# Patient Record
Sex: MALE | Race: WHITE | NOT HISPANIC OR LATINO | Employment: FULL TIME | URBAN - METROPOLITAN AREA
[De-identification: names, ages, dates, MRNs, and addresses within clinical notes are randomized per-mention and may not be internally consistent; named-entity substitution may affect disease eponyms.]

---

## 2023-03-14 LAB
ALANINE AMINOTRANSFERASE (SGPT) (U/L) IN SER/PLAS: 116 U/L (ref 10–52)
ALBUMIN (G/DL) IN SER/PLAS: 4.4 G/DL (ref 3.4–5)
ALKALINE PHOSPHATASE (U/L) IN SER/PLAS: 78 U/L (ref 33–120)
ANION GAP IN SER/PLAS: 12 MMOL/L (ref 10–20)
ASPARTATE AMINOTRANSFERASE (SGOT) (U/L) IN SER/PLAS: 60 U/L (ref 9–39)
BASOPHILS (10*3/UL) IN BLOOD BY AUTOMATED COUNT: 0.08 X10E9/L (ref 0–0.1)
BASOPHILS/100 LEUKOCYTES IN BLOOD BY AUTOMATED COUNT: 1.6 % (ref 0–2)
BILIRUBIN DIRECT (MG/DL) IN SER/PLAS: 0.2 MG/DL (ref 0–0.3)
BILIRUBIN TOTAL (MG/DL) IN SER/PLAS: 1.3 MG/DL (ref 0–1.2)
CALCIUM (MG/DL) IN SER/PLAS: 9.5 MG/DL (ref 8.6–10.6)
CARBON DIOXIDE, TOTAL (MMOL/L) IN SER/PLAS: 28 MMOL/L (ref 21–32)
CERULOPLASMIN (MG/DL) IN SER/PLAS: 25 MG/DL (ref 20–60)
CHLORIDE (MMOL/L) IN SER/PLAS: 103 MMOL/L (ref 98–107)
CREATININE (MG/DL) IN SER/PLAS: 1.04 MG/DL (ref 0.5–1.3)
EOSINOPHILS (10*3/UL) IN BLOOD BY AUTOMATED COUNT: 0.25 X10E9/L (ref 0–0.7)
EOSINOPHILS/100 LEUKOCYTES IN BLOOD BY AUTOMATED COUNT: 5 % (ref 0–6)
ERYTHROCYTE DISTRIBUTION WIDTH (RATIO) BY AUTOMATED COUNT: 12.7 % (ref 11.5–14.5)
ERYTHROCYTE MEAN CORPUSCULAR HEMOGLOBIN CONCENTRATION (G/DL) BY AUTOMATED: 33.5 G/DL (ref 32–36)
ERYTHROCYTE MEAN CORPUSCULAR VOLUME (FL) BY AUTOMATED COUNT: 94 FL (ref 80–100)
ERYTHROCYTES (10*6/UL) IN BLOOD BY AUTOMATED COUNT: 5.16 X10E12/L (ref 4.5–5.9)
FERRITIN (UG/LL) IN SER/PLAS: 292 UG/L (ref 20–300)
GFR MALE: 85 ML/MIN/1.73M2
GLUCOSE (MG/DL) IN SER/PLAS: 81 MG/DL (ref 74–99)
HEMATOCRIT (%) IN BLOOD BY AUTOMATED COUNT: 48.4 % (ref 41–52)
HEMOGLOBIN (G/DL) IN BLOOD: 16.2 G/DL (ref 13.5–17.5)
HEPATITIS A TOTAL AB INTERPRETATION: NONREACTIVE
HEPATITIS B VIRUS SURFACE AB (MIU/ML) IN SERUM: <3.1 MIU/ML
IMMATURE GRANULOCYTES/100 LEUKOCYTES IN BLOOD BY AUTOMATED COUNT: 0.2 % (ref 0–0.9)
INR IN PPP BY COAGULATION ASSAY: 1 (ref 0.9–1.1)
IRON (UG/DL) IN SER/PLAS: 110 UG/DL (ref 35–150)
IRON BINDING CAPACITY (UG/DL) IN SER/PLAS: 350 UG/DL (ref 240–445)
IRON SATURATION (%) IN SER/PLAS: 31 % (ref 25–45)
LEUKOCYTES (10*3/UL) IN BLOOD BY AUTOMATED COUNT: 5 X10E9/L (ref 4.4–11.3)
LYMPHOCYTES (10*3/UL) IN BLOOD BY AUTOMATED COUNT: 1.7 X10E9/L (ref 1.2–4.8)
LYMPHOCYTES/100 LEUKOCYTES IN BLOOD BY AUTOMATED COUNT: 34.1 % (ref 13–44)
MONOCYTES (10*3/UL) IN BLOOD BY AUTOMATED COUNT: 0.53 X10E9/L (ref 0.1–1)
MONOCYTES/100 LEUKOCYTES IN BLOOD BY AUTOMATED COUNT: 10.6 % (ref 2–10)
NEUTROPHILS (10*3/UL) IN BLOOD BY AUTOMATED COUNT: 2.42 X10E9/L (ref 1.2–7.7)
NEUTROPHILS/100 LEUKOCYTES IN BLOOD BY AUTOMATED COUNT: 48.5 % (ref 40–80)
NRBC (PER 100 WBCS) BY AUTOMATED COUNT: 0 /100 WBC (ref 0–0)
PLATELETS (10*3/UL) IN BLOOD AUTOMATED COUNT: 187 X10E9/L (ref 150–450)
POTASSIUM (MMOL/L) IN SER/PLAS: 4 MMOL/L (ref 3.5–5.3)
PROTEIN TOTAL: 7.5 G/DL (ref 6.4–8.2)
PROTHROMBIN TIME (PT) IN PPP BY COAGULATION ASSAY: 11.2 SEC (ref 9.8–13.4)
SODIUM (MMOL/L) IN SER/PLAS: 139 MMOL/L (ref 136–145)
UREA NITROGEN (MG/DL) IN SER/PLAS: 11 MG/DL (ref 6–23)

## 2023-03-15 LAB
ANA PATTERN: ABNORMAL
ANA TITER: ABNORMAL
ANTI-CENTROMERE: <0.2 AI
ANTI-CHROMATIN: <0.2 AI
ANTI-DNA (DS): <1 IU/ML
ANTI-JO-1 IGG: <0.2 AI
ANTI-NUCLEAR ANTIBODY (ANA): POSITIVE
ANTI-RIBOSOMAL P: <0.2 AI
ANTI-RNP: <0.2 AI
ANTI-SCL-70: <0.2 AI
ANTI-SM/RNP: <0.2 AI
ANTI-SM: <0.2 AI
ANTI-SMOOTH MUSCLE ANTIBODY: NEGATIVE
ANTI-SSA: <0.2 AI
ANTI-SSB: <0.2 AI
HCV PCR QUANT: NOT DETECTED IU/ML
HCV RNA, PCR LOG: NORMAL LOG10 IU/ML
MITOCHONDRIAL ANTIBODY: NEGATIVE

## 2023-03-20 LAB
ALPHA-1 ANTITRYPSIN PHENOTYPE: NORMAL
ALPHA-1-ANTITRYPSIN (REF): 160 MG/DL (ref 90–200)

## 2023-04-11 LAB
ALANINE AMINOTRANSFERASE (SGPT) (U/L) IN SER/PLAS: 129 U/L (ref 10–52)
ALANINE AMINOTRANSFERASE (SGPT) (U/L) IN SER/PLAS: 130 U/L (ref 10–52)
ALBUMIN (G/DL) IN SER/PLAS: 3.8 G/DL (ref 3.4–5)
ALBUMIN (G/DL) IN SER/PLAS: 3.9 G/DL (ref 3.4–5)
ALKALINE PHOSPHATASE (U/L) IN SER/PLAS: 71 U/L (ref 33–120)
ALKALINE PHOSPHATASE (U/L) IN SER/PLAS: 71 U/L (ref 33–120)
ANION GAP IN SER/PLAS: 11 MMOL/L (ref 10–20)
ASPARTATE AMINOTRANSFERASE (SGOT) (U/L) IN SER/PLAS: 68 U/L (ref 9–39)
ASPARTATE AMINOTRANSFERASE (SGOT) (U/L) IN SER/PLAS: 69 U/L (ref 9–39)
BILIRUBIN DIRECT (MG/DL) IN SER/PLAS: 0.2 MG/DL (ref 0–0.3)
BILIRUBIN TOTAL (MG/DL) IN SER/PLAS: 1.4 MG/DL (ref 0–1.2)
BILIRUBIN TOTAL (MG/DL) IN SER/PLAS: 1.4 MG/DL (ref 0–1.2)
CALCIUM (MG/DL) IN SER/PLAS: 8.8 MG/DL (ref 8.6–10.6)
CARBON DIOXIDE, TOTAL (MMOL/L) IN SER/PLAS: 29 MMOL/L (ref 21–32)
CHLORIDE (MMOL/L) IN SER/PLAS: 105 MMOL/L (ref 98–107)
CHOLESTEROL (MG/DL) IN SER/PLAS: 229 MG/DL (ref 0–199)
CHOLESTEROL IN HDL (MG/DL) IN SER/PLAS: 59.4 MG/DL
CHOLESTEROL/HDL RATIO: 3.9
CREATININE (MG/DL) IN SER/PLAS: 0.83 MG/DL (ref 0.5–1.3)
GFR MALE: >90 ML/MIN/1.73M2
GLUCOSE (MG/DL) IN SER/PLAS: 86 MG/DL (ref 74–99)
LDL: 153 MG/DL (ref 0–99)
POTASSIUM (MMOL/L) IN SER/PLAS: 4.5 MMOL/L (ref 3.5–5.3)
PROTEIN TOTAL: 7.1 G/DL (ref 6.4–8.2)
PROTEIN TOTAL: 7.2 G/DL (ref 6.4–8.2)
SODIUM (MMOL/L) IN SER/PLAS: 140 MMOL/L (ref 136–145)
TRIGLYCERIDE (MG/DL) IN SER/PLAS: 81 MG/DL (ref 0–149)
UREA NITROGEN (MG/DL) IN SER/PLAS: 10 MG/DL (ref 6–23)
VLDL: 16 MG/DL (ref 0–40)

## 2023-07-15 LAB
11-NOR-9-CARBOXY-THC, URN, QUANT: 343 NG/ML
AMPHETAMINES,URINE: 536 NG/ML
MDA,URINE: <200 NG/ML
MDEA,URINE: <200 NG/ML
MDMA,UR: <200 NG/ML
METHAMPHETAMINE QUANTITATIVE URINE: <200 NG/ML
PHENTERMINE,UR: <200 NG/ML

## 2023-07-18 LAB
6-ACETYLMORPHINE: <25 NG/ML
7-AMINOCLONAZEPAM: <25 NG/ML
ALPHA-HYDROXYALPRAZOLAM: <25 NG/ML
ALPHA-HYDROXYMIDAZOLAM: <25 NG/ML
ALPRAZOLAM: <25 NG/ML
AMPHETAMINE (PRESENCE) IN URINE BY SCREEN METHOD: ABNORMAL
BARBITURATES PRESENCE IN URINE BY SCREEN METHOD: ABNORMAL
CANNABINOIDS IN URINE BY SCREEN METHOD: ABNORMAL
CHLORDIAZEPOXIDE: <25 NG/ML
CLONAZEPAM: <25 NG/ML
COCAINE (PRESENCE) IN URINE BY SCREEN METHOD: ABNORMAL
CODEINE: <50 NG/ML
CREATINE, URINE FOR DRUG: 158.3 MG/DL
DIAZEPAM: <25 NG/ML
DRUG SCREEN COMMENT URINE: ABNORMAL
EDDP: <25 NG/ML
FENTANYL CONFIRMATION, URINE: <2.5 NG/ML
HYDROCODONE: <25 NG/ML
HYDROMORPHONE: <25 NG/ML
LORAZEPAM: <25 NG/ML
METHADONE CONFIRMATION,URINE: <25 NG/ML
MIDAZOLAM: <25 NG/ML
MORPHINE URINE: <50 NG/ML
NORDIAZEPAM: <25 NG/ML
NORFENTANYL: <2.5 NG/ML
NORHYDROCODONE: <25 NG/ML
NOROXYCODONE: <25 NG/ML
O-DESMETHYLTRAMADOL: <50 NG/ML
OXAZEPAM: <25 NG/ML
OXYCODONE: <25 NG/ML
OXYMORPHONE: <25 NG/ML
PHENCYCLIDINE (PRESENCE) IN URINE BY SCREEN METHOD: ABNORMAL
TEMAZEPAM: <25 NG/ML
TRAMADOL: <50 NG/ML
ZOLPIDEM METABOLITE (ZCA): <25 NG/ML
ZOLPIDEM: <25 NG/ML

## 2023-08-28 LAB
ALANINE AMINOTRANSFERASE (SGPT) (U/L) IN SER/PLAS: 70 U/L (ref 10–52)
ALBUMIN (G/DL) IN SER/PLAS: 4.3 G/DL (ref 3.4–5)
ALKALINE PHOSPHATASE (U/L) IN SER/PLAS: 68 U/L (ref 33–120)
ASPARTATE AMINOTRANSFERASE (SGOT) (U/L) IN SER/PLAS: 48 U/L (ref 9–39)
BILIRUBIN DIRECT (MG/DL) IN SER/PLAS: 0.1 MG/DL (ref 0–0.3)
BILIRUBIN TOTAL (MG/DL) IN SER/PLAS: 0.8 MG/DL (ref 0–1.2)
PROTEIN TOTAL: 7.6 G/DL (ref 6.4–8.2)

## 2023-08-30 LAB
6-ACETYLMORPHINE: <25 NG/ML
7-AMINOCLONAZEPAM: <25 NG/ML
ALPHA-HYDROXYALPRAZOLAM: <25 NG/ML
ALPHA-HYDROXYMIDAZOLAM: <25 NG/ML
ALPRAZOLAM: <25 NG/ML
AMPHETAMINE (PRESENCE) IN URINE BY SCREEN METHOD: NORMAL
BARBITURATES PRESENCE IN URINE BY SCREEN METHOD: NORMAL
CANNABINOIDS IN URINE BY SCREEN METHOD: NORMAL
CHLORDIAZEPOXIDE: <25 NG/ML
CLONAZEPAM: <25 NG/ML
COCAINE (PRESENCE) IN URINE BY SCREEN METHOD: NORMAL
CODEINE: <50 NG/ML
CREATINE, URINE FOR DRUG: 86.6 MG/DL
DIAZEPAM: <25 NG/ML
DRUG SCREEN COMMENT URINE: NORMAL
EDDP: <25 NG/ML
FENTANYL CONFIRMATION, URINE: <2.5 NG/ML
HYDROCODONE: <25 NG/ML
HYDROMORPHONE: <25 NG/ML
LORAZEPAM: <25 NG/ML
METHADONE CONFIRMATION,URINE: <25 NG/ML
MIDAZOLAM: <25 NG/ML
MORPHINE URINE: <50 NG/ML
NORDIAZEPAM: <25 NG/ML
NORFENTANYL: <2.5 NG/ML
NORHYDROCODONE: <25 NG/ML
NOROXYCODONE: <25 NG/ML
O-DESMETHYLTRAMADOL: <50 NG/ML
OXAZEPAM: <25 NG/ML
OXYCODONE: <25 NG/ML
OXYMORPHONE: <25 NG/ML
PHENCYCLIDINE (PRESENCE) IN URINE BY SCREEN METHOD: NORMAL
TEMAZEPAM: <25 NG/ML
TRAMADOL: <50 NG/ML
ZOLPIDEM METABOLITE (ZCA): <25 NG/ML
ZOLPIDEM: <25 NG/ML

## 2023-09-11 PROBLEM — F98.8 ATTENTION DEFICIT DISORDER OF ADULT: Status: ACTIVE | Noted: 2023-09-11

## 2023-09-11 PROBLEM — Q55.61 PENILE CURVE: Status: ACTIVE | Noted: 2023-09-11

## 2023-09-11 PROBLEM — E78.5 HYPERLIPIDEMIA: Status: ACTIVE | Noted: 2023-09-11

## 2023-09-11 PROBLEM — F43.0 ACUTE STRESS REACTION: Status: ACTIVE | Noted: 2023-09-11

## 2023-09-11 PROBLEM — F32.A DEPRESSION: Status: ACTIVE | Noted: 2023-09-11

## 2023-09-11 PROBLEM — G47.00 INSOMNIA: Status: ACTIVE | Noted: 2023-09-11

## 2023-09-11 PROBLEM — R03.0 ELEVATED BLOOD-PRESSURE READING WITHOUT DIAGNOSIS OF HYPERTENSION: Status: ACTIVE | Noted: 2023-09-11

## 2023-09-11 PROBLEM — E55.9 VITAMIN D DEFICIENCY: Status: ACTIVE | Noted: 2023-09-11

## 2023-09-11 PROBLEM — G47.9 SLEEP DISTURBANCES: Status: ACTIVE | Noted: 2023-09-11

## 2023-09-11 PROBLEM — H49.10 PARALYTIC STRABISMUS, FOURTH OR TROCHLEAR NERVE PALSY: Status: ACTIVE | Noted: 2023-09-11

## 2023-09-11 PROBLEM — N52.9 ERECTILE DYSFUNCTION: Status: ACTIVE | Noted: 2023-09-11

## 2023-09-11 RX ORDER — ZOLPIDEM TARTRATE 10 MG/1
10 TABLET ORAL NIGHTLY PRN
COMMUNITY
Start: 2013-01-08

## 2023-09-11 RX ORDER — LISDEXAMFETAMINE DIMESYLATE 40 MG/1
20 CAPSULE ORAL EVERY MORNING
COMMUNITY
Start: 2022-09-10 | End: 2023-10-11 | Stop reason: SDUPTHER

## 2023-09-11 RX ORDER — BUPROPION HYDROCHLORIDE 200 MG/1
200 TABLET, EXTENDED RELEASE ORAL DAILY
COMMUNITY
Start: 2020-11-19 | End: 2024-01-26 | Stop reason: SDUPTHER

## 2023-09-11 RX ORDER — BUPROPION HYDROCHLORIDE 100 MG/1
100 TABLET, EXTENDED RELEASE ORAL DAILY
COMMUNITY
End: 2023-10-12

## 2023-10-10 ENCOUNTER — TELEPHONE (OUTPATIENT)
Dept: RHEUMATOLOGY | Facility: CLINIC | Age: 58
End: 2023-10-10
Payer: COMMERCIAL

## 2023-10-10 DIAGNOSIS — F98.8 ATTENTION DEFICIT DISORDER, UNSPECIFIED HYPERACTIVITY PRESENCE: Primary | ICD-10-CM

## 2023-10-10 NOTE — TELEPHONE ENCOUNTER
Patient called for a refill on Vyvanse 20mg #60 one capsule in the morning and a 2nd capsule before 2pm     [facility] [phone]

## 2023-10-11 RX ORDER — LISDEXAMFETAMINE DIMESYLATE 40 MG/1
CAPSULE ORAL
Qty: 60 CAPSULE | Refills: 0 | Status: SHIPPED | OUTPATIENT
Start: 2023-10-11 | End: 2023-10-20 | Stop reason: SDUPTHER

## 2023-10-12 DIAGNOSIS — F43.29 STRESS AND ADJUSTMENT REACTION: Primary | ICD-10-CM

## 2023-10-12 RX ORDER — BUPROPION HYDROCHLORIDE 100 MG/1
100 TABLET, EXTENDED RELEASE ORAL DAILY
Qty: 90 TABLET | Refills: 0 | Status: SHIPPED | OUTPATIENT
Start: 2023-10-12 | End: 2024-01-19

## 2023-10-20 ENCOUNTER — TELEPHONE (OUTPATIENT)
Dept: PRIMARY CARE | Facility: CLINIC | Age: 58
End: 2023-10-20
Payer: COMMERCIAL

## 2023-10-20 DIAGNOSIS — F98.8 ATTENTION DEFICIT DISORDER, UNSPECIFIED HYPERACTIVITY PRESENCE: ICD-10-CM

## 2023-10-20 RX ORDER — LISDEXAMFETAMINE DIMESYLATE CAPSULES 20 MG/1
CAPSULE ORAL
Qty: 60 CAPSULE | Refills: 0 | Status: SHIPPED | OUTPATIENT
Start: 2023-10-20 | End: 2023-11-27 | Stop reason: SDUPTHER

## 2023-10-25 ENCOUNTER — TELEPHONE (OUTPATIENT)
Dept: PRIMARY CARE | Facility: CLINIC | Age: 58
End: 2023-10-25
Payer: COMMERCIAL

## 2023-10-25 NOTE — TELEPHONE ENCOUNTER
Called pharm.  Clarified the Rx.  It will be ready for Pt today.  Called Pt and relayed info.  Lg    Patient called and stated they need clarification on his vyvanse prescription  He has been waiting over a week for this prescirption

## 2023-10-26 NOTE — TELEPHONE ENCOUNTER
Called Pharm.  Clarification done.  Called Pt and relayed info....pharmacy will have it ready today.  LG

## 2023-11-06 ENCOUNTER — APPOINTMENT (OUTPATIENT)
Dept: PRIMARY CARE | Facility: CLINIC | Age: 58
End: 2023-11-06
Payer: COMMERCIAL

## 2023-11-22 ENCOUNTER — APPOINTMENT (OUTPATIENT)
Dept: PRIMARY CARE | Facility: CLINIC | Age: 58
End: 2023-11-22
Payer: COMMERCIAL

## 2023-11-22 ENCOUNTER — TELEPHONE (OUTPATIENT)
Dept: PRIMARY CARE | Facility: CLINIC | Age: 58
End: 2023-11-22

## 2023-11-22 DIAGNOSIS — F98.8 ATTENTION DEFICIT DISORDER, UNSPECIFIED HYPERACTIVITY PRESENCE: ICD-10-CM

## 2023-11-22 NOTE — TELEPHONE ENCOUNTER
Patient called for a refill on Vyvanse 20 mg #60 one twice a day  [facility] [phone]        Patient states he had to leave and could not wait for his appointment but rescheduled for next week

## 2023-11-27 RX ORDER — LISDEXAMFETAMINE DIMESYLATE CAPSULES 20 MG/1
CAPSULE ORAL
Qty: 60 CAPSULE | Refills: 0 | Status: SHIPPED | OUTPATIENT
Start: 2023-11-27 | End: 2024-01-26 | Stop reason: SDUPTHER

## 2024-01-18 DIAGNOSIS — F43.29 STRESS AND ADJUSTMENT REACTION: ICD-10-CM

## 2024-01-19 ENCOUNTER — TELEPHONE (OUTPATIENT)
Dept: PRIMARY CARE | Facility: CLINIC | Age: 59
End: 2024-01-19
Payer: COMMERCIAL

## 2024-01-19 DIAGNOSIS — F98.8 ATTENTION DEFICIT DISORDER OF ADULT: Primary | ICD-10-CM

## 2024-01-19 RX ORDER — LISDEXAMFETAMINE DIMESYLATE CAPSULES 20 MG/1
20 CAPSULE ORAL EVERY MORNING
Qty: 30 CAPSULE | Refills: 0 | Status: SHIPPED | OUTPATIENT
Start: 2024-01-19 | End: 2024-01-26 | Stop reason: SDUPTHER

## 2024-01-19 RX ORDER — LISDEXAMFETAMINE DIMESYLATE CAPSULES 20 MG/1
20 CAPSULE ORAL EVERY MORNING
COMMUNITY
End: 2024-01-19 | Stop reason: SDUPTHER

## 2024-01-19 RX ORDER — BUPROPION HYDROCHLORIDE 100 MG/1
100 TABLET, EXTENDED RELEASE ORAL DAILY
Qty: 90 TABLET | Refills: 0 | Status: SHIPPED | OUTPATIENT
Start: 2024-01-19 | End: 2024-01-26

## 2024-01-26 ENCOUNTER — LAB (OUTPATIENT)
Dept: LAB | Facility: LAB | Age: 59
End: 2024-01-26
Payer: COMMERCIAL

## 2024-01-26 ENCOUNTER — OFFICE VISIT (OUTPATIENT)
Dept: PRIMARY CARE | Facility: CLINIC | Age: 59
End: 2024-01-26
Payer: COMMERCIAL

## 2024-01-26 VITALS
BODY MASS INDEX: 28.59 KG/M2 | WEIGHT: 205 LBS | SYSTOLIC BLOOD PRESSURE: 108 MMHG | DIASTOLIC BLOOD PRESSURE: 72 MMHG | RESPIRATION RATE: 16 BRPM

## 2024-01-26 DIAGNOSIS — Z12.5 PROSTATE CANCER SCREENING: ICD-10-CM

## 2024-01-26 DIAGNOSIS — F43.29 STRESS AND ADJUSTMENT REACTION: Primary | ICD-10-CM

## 2024-01-26 DIAGNOSIS — R23.9 SKIN CHANGE: ICD-10-CM

## 2024-01-26 DIAGNOSIS — F98.8 ATTENTION DEFICIT DISORDER OF ADULT: ICD-10-CM

## 2024-01-26 DIAGNOSIS — F98.8 ATTENTION DEFICIT DISORDER, UNSPECIFIED HYPERACTIVITY PRESENCE: ICD-10-CM

## 2024-01-26 LAB
ALBUMIN SERPL BCP-MCNC: 4.6 G/DL (ref 3.4–5)
ALP SERPL-CCNC: 74 U/L (ref 33–120)
ALT SERPL W P-5'-P-CCNC: 183 U/L (ref 10–52)
ANION GAP SERPL CALC-SCNC: 15 MMOL/L (ref 10–20)
AST SERPL W P-5'-P-CCNC: 93 U/L (ref 9–39)
BILIRUB SERPL-MCNC: 1.6 MG/DL (ref 0–1.2)
BUN SERPL-MCNC: 13 MG/DL (ref 6–23)
CALCIUM SERPL-MCNC: 10.1 MG/DL (ref 8.6–10.6)
CHLORIDE SERPL-SCNC: 103 MMOL/L (ref 98–107)
CHOLEST SERPL-MCNC: 225 MG/DL (ref 0–199)
CHOLESTEROL/HDL RATIO: 4.1
CO2 SERPL-SCNC: 26 MMOL/L (ref 21–32)
CREAT SERPL-MCNC: 0.93 MG/DL (ref 0.5–1.3)
EGFRCR SERPLBLD CKD-EPI 2021: >90 ML/MIN/1.73M*2
GLUCOSE SERPL-MCNC: 81 MG/DL (ref 74–99)
HDLC SERPL-MCNC: 55.1 MG/DL
LDLC SERPL CALC-MCNC: 152 MG/DL
NON HDL CHOLESTEROL: 170 MG/DL (ref 0–149)
POTASSIUM SERPL-SCNC: 4.5 MMOL/L (ref 3.5–5.3)
PROT SERPL-MCNC: 8.2 G/DL (ref 6.4–8.2)
PSA SERPL-MCNC: 0.41 NG/ML
SODIUM SERPL-SCNC: 139 MMOL/L (ref 136–145)
TRIGL SERPL-MCNC: 89 MG/DL (ref 0–149)
VLDL: 18 MG/DL (ref 0–40)

## 2024-01-26 PROCEDURE — 80061 LIPID PANEL: CPT

## 2024-01-26 PROCEDURE — 99214 OFFICE O/P EST MOD 30 MIN: CPT | Performed by: INTERNAL MEDICINE

## 2024-01-26 PROCEDURE — 36415 COLL VENOUS BLD VENIPUNCTURE: CPT

## 2024-01-26 PROCEDURE — 80053 COMPREHEN METABOLIC PANEL: CPT

## 2024-01-26 PROCEDURE — 1036F TOBACCO NON-USER: CPT | Performed by: INTERNAL MEDICINE

## 2024-01-26 PROCEDURE — 84153 ASSAY OF PSA TOTAL: CPT

## 2024-01-26 RX ORDER — TADALAFIL 20 MG/1
20 TABLET ORAL DAILY PRN
Qty: 20 TABLET | Refills: 3 | Status: SHIPPED | OUTPATIENT
Start: 2024-01-26 | End: 2025-01-25

## 2024-01-26 RX ORDER — TADALAFIL 20 MG/1
20 TABLET ORAL DAILY PRN
Qty: 20 TABLET | Refills: 3 | Status: SHIPPED | OUTPATIENT
Start: 2024-01-26 | End: 2024-01-26

## 2024-01-26 RX ORDER — LISDEXAMFETAMINE DIMESYLATE CAPSULES 20 MG/1
20 CAPSULE ORAL EVERY MORNING
Qty: 30 CAPSULE | Refills: 0 | Status: SHIPPED | OUTPATIENT
Start: 2024-02-19 | End: 2024-03-01 | Stop reason: SDUPTHER

## 2024-01-26 RX ORDER — BUPROPION HYDROCHLORIDE 200 MG/1
200 TABLET, EXTENDED RELEASE ORAL DAILY
Qty: 180 TABLET | Refills: 1 | Status: SHIPPED | OUTPATIENT
Start: 2024-01-26

## 2024-01-26 RX ORDER — LISDEXAMFETAMINE DIMESYLATE CAPSULES 20 MG/1
CAPSULE ORAL
Qty: 60 CAPSULE | Refills: 0 | Status: SHIPPED | OUTPATIENT
Start: 2024-03-19

## 2024-01-26 RX ORDER — VALACYCLOVIR HYDROCHLORIDE 1 G/1
1000 TABLET, FILM COATED ORAL 3 TIMES DAILY
COMMUNITY
Start: 2023-02-11

## 2024-01-26 RX ORDER — SILDENAFIL CITRATE 20 MG/1
20 TABLET ORAL AS NEEDED
COMMUNITY
Start: 2021-01-06

## 2024-01-26 ASSESSMENT — PATIENT HEALTH QUESTIONNAIRE - PHQ9
SUM OF ALL RESPONSES TO PHQ9 QUESTIONS 1 AND 2: 0
2. FEELING DOWN, DEPRESSED OR HOPELESS: NOT AT ALL
1. LITTLE INTEREST OR PLEASURE IN DOING THINGS: NOT AT ALL

## 2024-01-26 ASSESSMENT — COLUMBIA-SUICIDE SEVERITY RATING SCALE - C-SSRS
6. HAVE YOU EVER DONE ANYTHING, STARTED TO DO ANYTHING, OR PREPARED TO DO ANYTHING TO END YOUR LIFE?: NO
2. HAVE YOU ACTUALLY HAD ANY THOUGHTS OF KILLING YOURSELF?: NO
1. IN THE PAST MONTH, HAVE YOU WISHED YOU WERE DEAD OR WISHED YOU COULD GO TO SLEEP AND NOT WAKE UP?: NO

## 2024-01-26 ASSESSMENT — ENCOUNTER SYMPTOMS
DEPRESSION: 0
LOSS OF SENSATION IN FEET: 0
OCCASIONAL FEELINGS OF UNSTEADINESS: 0

## 2024-01-26 NOTE — PROGRESS NOTES
Subjective   Patient ID: Travis Pierson is a 58 y.o. male who presents for FUV.    HPI  Patient in for a visit  Bp is high, no headaches ,   Rash over his ear , has been there for a  while does wear glasses but not all the time   Review of Systems  General: Denies fever, chills, night sweats,  Eyes: Negative for recent visual changes  Ears, Nose, Throat :  Negative for hearing changes, sinus discomfort  Dermatologic: Negative for new skin conditions, rash  Respiratory: Negative for wheezing, shortness of breath, cough  Cardiovascular: Negative for chest pain, palpitations, or leg swelling  Gastrointestinal: Negative for nausea/vomiting, abdominal pain, changes in bowel habits  Genitourinary NEGATIVE FOR URINARY INCONTINENCE urgency , frequency, discomfort   Musculoskeletal: see hpi  Neurological: Negative for headaches, dizziness    Previous history  Past Medical History:   Diagnosis Date    Contact with and (suspected) exposure to unspecified communicable disease 04/08/2021    Exposure to communicable disease    Depression, unspecified 11/19/2020    Depression    Disorder of penis, unspecified 11/12/2021    Penile lesion    Elevated blood-pressure reading, without diagnosis of hypertension 04/15/2020    Elevated blood pressure reading    Encounter for immunization 08/11/2021    Need for tetanus booster    Encounter for screening for malignant neoplasm of prostate 12/02/2022    Prostate cancer screening    Follicular cyst of the skin and subcutaneous tissue, unspecified 09/05/2019    Skin cyst    Insomnia, unspecified 06/03/2022    Insomnia    Other long term (current) drug therapy 06/03/2022    Medication management    Unspecified contact dermatitis, unspecified cause 07/08/2021    Contact dermatitis     Past Surgical History:   Procedure Laterality Date    HERNIA REPAIR  2008    Inguinal Hernia Repair    OTHER SURGICAL HISTORY  08/11/2021    Colonoscopy     Social History     Tobacco Use    Smoking status: Never     Smokeless tobacco: Never   Vaping Use    Vaping Use: Never used   Substance Use Topics    Alcohol use: Yes     Alcohol/week: 12.0 standard drinks of alcohol     Types: 4 Glasses of wine, 8 Cans of beer per week     Comment: 2 glasses wine w/dinner ; 2-3 beers a night    Drug use: Never     Family History   Problem Relation Name Age of Onset    Thyroid disease Mother      Diverticulosis Mother      Coronary artery disease Father       No Known Allergies  Current Outpatient Medications   Medication Instructions    buPROPion SR (WELLBUTRIN SR) 200 mg, oral, Daily    buPROPion SR (WELLBUTRIN SR) 100 mg, oral, Daily    lisdexamfetamine (Vyvanse) 20 mg capsule May take second dose before 2pm if needed    lisdexamfetamine (VYVANSE) 20 mg, oral, Every morning    sildenafil (REVATIO) 20 mg, oral, As needed    valACYclovir (VALTREX) 1,000 mg, oral, 3 times daily    zolpidem (AMBIEN) 10 mg, oral, Nightly PRN       Objective       Physical Exam    Vital Signs: as recorded above  General: Well groomed, well nourished   Orientation:  Alert , oriented to time, place , and person   Mood and Affect:  Cooperative , no apparent distress normal affect  Skin: Good color, good turgor  Eyes: Extra ocular muscle movements intact, anicteric sclerae  Neck: Supple, full range of movement  Chest: Normal breath sounds, normal chest wall exam, symmetric, good air entry, clear to auscultation  Heart: Regular rate and rhythm, without murmur, gallop, or rubs  BACK:  no CTLS spine tenderness, no flank tenderness  Extremities: full range of movement  bilateral UE and bilateral LE,  no lower extremity edema  Neurological: Alert, oriented, cranial nerves II-XII grossly intact except for visual acuity  Sensation:  Intact   Gait: normal steady    Assessment/Plan   Travis Pierson is a 58 y.o. male who presents for the concerns below:    Problem List Items Addressed This Visit    None    .elevated blood pressure , utd with eyecheck ,checking rfp blood  pressure better with recheck on manual sphygmomanometer  Follow low salt diet, exercise as discussed, weight control.     ATTENTION DEFICIT DISORDER PLAN: Effective at current dose . No side effects, , sleep, appetite, and weight stable. Medication will be continued used only for the prescribed medical condition If problems arise patient knows to discontinue meds and come back sooner.   OARRS report reviewed today  OARRS website checked and validated.  All prescriptions have been appropriately filled.  No suspicious activity was identified.   CONTROLLED SUBSTANCE AGREEMENT SIGNED      Discussed with:   Return in :    Portions of this note were generated using digital voice recognition software, and may contain grammatical errors       Anayeli Nixon MD  01/26/24  2:23 PM

## 2024-03-01 DIAGNOSIS — F98.8 ATTENTION DEFICIT DISORDER OF ADULT: ICD-10-CM

## 2024-03-01 RX ORDER — LISDEXAMFETAMINE DIMESYLATE CAPSULES 20 MG/1
20 CAPSULE ORAL EVERY MORNING
Qty: 60 CAPSULE | Refills: 0 | Status: SHIPPED | OUTPATIENT
Start: 2024-03-01

## 2024-03-01 NOTE — TELEPHONE ENCOUNTER
I don't think this can be filled before 3/19/24?  LG        Patient called for a refill on    Vyvanse 20mg capsules #60 take one capsule in the morning may take second tablet before  2pm    [phone]

## 2024-03-20 DIAGNOSIS — E78.5 HYPERLIPIDEMIA, UNSPECIFIED HYPERLIPIDEMIA TYPE: Primary | ICD-10-CM

## 2024-04-26 ENCOUNTER — APPOINTMENT (OUTPATIENT)
Dept: PRIMARY CARE | Facility: CLINIC | Age: 59
End: 2024-04-26

## 2024-05-30 ENCOUNTER — TELEPHONE (OUTPATIENT)
Dept: PRIMARY CARE | Facility: CLINIC | Age: 59
End: 2024-05-30

## 2024-05-30 NOTE — TELEPHONE ENCOUNTER
Patient called and has a personal issue he would like to discuss- he was unable to send a message through My chart directly to you       [phone]

## 2024-05-31 DIAGNOSIS — E78.5 HYPERLIPIDEMIA, UNSPECIFIED HYPERLIPIDEMIA TYPE: Primary | ICD-10-CM

## 2024-05-31 DIAGNOSIS — B00.9 HSV (HERPES SIMPLEX VIRUS) INFECTION: ICD-10-CM

## 2024-05-31 DIAGNOSIS — L98.9 SKIN LESION: ICD-10-CM

## 2024-07-19 ENCOUNTER — TELEPHONE (OUTPATIENT)
Dept: PRIMARY CARE | Facility: CLINIC | Age: 59
End: 2024-07-19

## 2024-07-19 NOTE — TELEPHONE ENCOUNTER
Assisted Pt with scheduling AQ appt.  Printed out Rx form.  Ready for AQ signature.  LG to email form back to Pt.

## 2024-07-24 ENCOUNTER — LAB (OUTPATIENT)
Dept: LAB | Facility: LAB | Age: 59
End: 2024-07-24

## 2024-07-24 DIAGNOSIS — B00.9 HSV (HERPES SIMPLEX VIRUS) INFECTION: ICD-10-CM

## 2024-07-24 DIAGNOSIS — E78.5 HYPERLIPIDEMIA, UNSPECIFIED HYPERLIPIDEMIA TYPE: ICD-10-CM

## 2024-07-24 LAB
ALBUMIN SERPL BCP-MCNC: 4.3 G/DL (ref 3.4–5)
ALP SERPL-CCNC: 63 U/L (ref 33–120)
ALT SERPL W P-5'-P-CCNC: 41 U/L (ref 10–52)
ANION GAP SERPL CALC-SCNC: 15 MMOL/L (ref 10–20)
AST SERPL W P-5'-P-CCNC: 31 U/L (ref 9–39)
BASOPHILS # BLD AUTO: 0.08 X10*3/UL (ref 0–0.1)
BASOPHILS NFR BLD AUTO: 1.6 %
BILIRUB SERPL-MCNC: 1.3 MG/DL (ref 0–1.2)
BUN SERPL-MCNC: 11 MG/DL (ref 6–23)
CALCIUM SERPL-MCNC: 9.2 MG/DL (ref 8.6–10.6)
CHLORIDE SERPL-SCNC: 104 MMOL/L (ref 98–107)
CHOLEST SERPL-MCNC: 229 MG/DL (ref 0–199)
CHOLESTEROL/HDL RATIO: 4.4
CO2 SERPL-SCNC: 24 MMOL/L (ref 21–32)
CREAT SERPL-MCNC: 0.85 MG/DL (ref 0.5–1.3)
EGFRCR SERPLBLD CKD-EPI 2021: >90 ML/MIN/1.73M*2
EOSINOPHIL # BLD AUTO: 0.34 X10*3/UL (ref 0–0.7)
EOSINOPHIL NFR BLD AUTO: 7 %
ERYTHROCYTE [DISTWIDTH] IN BLOOD BY AUTOMATED COUNT: 12.6 % (ref 11.5–14.5)
GLUCOSE SERPL-MCNC: 87 MG/DL (ref 74–99)
HCT VFR BLD AUTO: 45.1 % (ref 41–52)
HDLC SERPL-MCNC: 51.9 MG/DL
HERPES SIMPLEX VIRUS 1 IGG: 5.1 INDEX
HERPES SIMPLEX VIRUS 2 IGG: 4 INDEX
HGB BLD-MCNC: 15.2 G/DL (ref 13.5–17.5)
IMM GRANULOCYTES # BLD AUTO: 0.01 X10*3/UL (ref 0–0.7)
IMM GRANULOCYTES NFR BLD AUTO: 0.2 % (ref 0–0.9)
LDLC SERPL CALC-MCNC: 156 MG/DL
LYMPHOCYTES # BLD AUTO: 2.18 X10*3/UL (ref 1.2–4.8)
LYMPHOCYTES NFR BLD AUTO: 44.6 %
MCH RBC QN AUTO: 31.2 PG (ref 26–34)
MCHC RBC AUTO-ENTMCNC: 33.7 G/DL (ref 32–36)
MCV RBC AUTO: 93 FL (ref 80–100)
MONOCYTES # BLD AUTO: 0.53 X10*3/UL (ref 0.1–1)
MONOCYTES NFR BLD AUTO: 10.8 %
NEUTROPHILS # BLD AUTO: 1.75 X10*3/UL (ref 1.2–7.7)
NEUTROPHILS NFR BLD AUTO: 35.8 %
NON HDL CHOLESTEROL: 177 MG/DL (ref 0–149)
NRBC BLD-RTO: 0 /100 WBCS (ref 0–0)
PLATELET # BLD AUTO: 214 X10*3/UL (ref 150–450)
POTASSIUM SERPL-SCNC: 4.2 MMOL/L (ref 3.5–5.3)
PROT SERPL-MCNC: 7.8 G/DL (ref 6.4–8.2)
RBC # BLD AUTO: 4.87 X10*6/UL (ref 4.5–5.9)
SODIUM SERPL-SCNC: 139 MMOL/L (ref 136–145)
TRIGL SERPL-MCNC: 106 MG/DL (ref 0–149)
VLDL: 21 MG/DL (ref 0–40)
WBC # BLD AUTO: 4.9 X10*3/UL (ref 4.4–11.3)

## 2024-07-24 PROCEDURE — 86695 HERPES SIMPLEX TYPE 1 TEST: CPT

## 2024-07-24 PROCEDURE — 80053 COMPREHEN METABOLIC PANEL: CPT

## 2024-07-24 PROCEDURE — 36415 COLL VENOUS BLD VENIPUNCTURE: CPT

## 2024-07-24 PROCEDURE — 86696 HERPES SIMPLEX TYPE 2 TEST: CPT

## 2024-07-24 PROCEDURE — 80061 LIPID PANEL: CPT

## 2024-07-24 PROCEDURE — 85025 COMPLETE CBC W/AUTO DIFF WBC: CPT

## 2024-07-25 ENCOUNTER — APPOINTMENT (OUTPATIENT)
Dept: PRIMARY CARE | Facility: CLINIC | Age: 59
End: 2024-07-25

## 2024-07-25 VITALS
RESPIRATION RATE: 16 BRPM | SYSTOLIC BLOOD PRESSURE: 124 MMHG | BODY MASS INDEX: 28.59 KG/M2 | WEIGHT: 205 LBS | DIASTOLIC BLOOD PRESSURE: 82 MMHG

## 2024-07-25 DIAGNOSIS — F43.29 STRESS AND ADJUSTMENT REACTION: ICD-10-CM

## 2024-07-25 DIAGNOSIS — F98.8 ATTENTION DEFICIT DISORDER, UNSPECIFIED HYPERACTIVITY PRESENCE: ICD-10-CM

## 2024-07-25 DIAGNOSIS — R93.1 ELEVATED CORONARY ARTERY CALCIUM SCORE: ICD-10-CM

## 2024-07-25 DIAGNOSIS — E78.00 HYPERCHOLESTEROLEMIA: Primary | ICD-10-CM

## 2024-07-25 PROCEDURE — 99214 OFFICE O/P EST MOD 30 MIN: CPT | Performed by: INTERNAL MEDICINE

## 2024-07-25 PROCEDURE — 1036F TOBACCO NON-USER: CPT | Performed by: INTERNAL MEDICINE

## 2024-07-25 RX ORDER — LISDEXAMFETAMINE DIMESYLATE 20 MG/1
CAPSULE ORAL
Qty: 60 CAPSULE | Refills: 0 | Status: SHIPPED | OUTPATIENT
Start: 2024-07-25

## 2024-07-25 RX ORDER — TADALAFIL 20 MG/1
20 TABLET ORAL DAILY PRN
Qty: 20 TABLET | Refills: 3 | Status: SHIPPED | OUTPATIENT
Start: 2024-07-25 | End: 2025-07-25

## 2024-07-25 NOTE — PATIENT INSTRUCTIONS
Your cholesterol is very high and despite having a good Calcium score of 0- , would like you to consider  Niacin to lower  your LDL down closer  to goal of 80 to prevent further build-up of plaque.  Niacin is a b vitamin not related to statins, but may still affect the liver and the muscle enzyme.   These are the common side effects along with possible flushing feeling warm after taking the niacin that is why nighttime might be a good time to take it - sort of the flushing you get when exercising.  Please continue to follow a strict low cholesterol diet and exercise.  Please come in for a follow-up visit in 3 months , we will need to recheck your lipid panel along with liver enzyme test and muscle enzyme CK .   The lab orders will be in to be done a few days prior to our 3 month visit  and you can proceed to any  lab fasting 9-12 hours (may have water or black coffee only), if you have any questions give us a call.  Thank you.

## 2024-07-25 NOTE — PROGRESS NOTES
Subjective   Patient ID: Travis Pierson is a 58 y.o. male who presents for Follow-up (Pt has living will an poa ).    HPI  Patient in for a visit  Sometimes foggy when he takes 1 a day  LDL is up and his cacs LAD was 98 need statin if not able to reduce may be familial     Review of Systems  General: Denies fever, chills, night sweats,  Eyes: Negative for recent visual changes  Ears, Nose, Throat :  Negative for hearing changes, sinus discomfort  Dermatologic: Negative for new skin conditions, rash  Respiratory: Negative for wheezing, shortness of breath, cough  Cardiovascular: Negative for chest pain, palpitations, or leg swelling  Gastrointestinal: Negative for nausea/vomiting, abdominal pain, changes in bowel habits  Genitourinary Negative for Urinary Incontinence  urgency , frequency, discomfort   Musculoskeletal: see hpi  Neurological: Negative for headaches, dizziness    Previous history  Past Medical History:   Diagnosis Date   • Contact with and (suspected) exposure to unspecified communicable disease 04/08/2021    Exposure to communicable disease   • Depression, unspecified 11/19/2020    Depression   • Disorder of penis, unspecified 11/12/2021    Penile lesion   • Elevated blood-pressure reading, without diagnosis of hypertension 04/15/2020    Elevated blood pressure reading   • Encounter for immunization 08/11/2021    Need for tetanus booster   • Encounter for screening for malignant neoplasm of prostate 12/02/2022    Prostate cancer screening   • Follicular cyst of the skin and subcutaneous tissue, unspecified 09/05/2019    Skin cyst   • Insomnia, unspecified 06/03/2022    Insomnia   • Other long term (current) drug therapy 06/03/2022    Medication management   • Unspecified contact dermatitis, unspecified cause 07/08/2021    Contact dermatitis     Past Surgical History:   Procedure Laterality Date   • HERNIA REPAIR  2008    Inguinal Hernia Repair   • OTHER SURGICAL HISTORY  08/11/2021    Colonoscopy      Social History     Tobacco Use   • Smoking status: Never   • Smokeless tobacco: Never   Vaping Use   • Vaping status: Never Used   Substance Use Topics   • Alcohol use: Yes     Alcohol/week: 12.0 standard drinks of alcohol     Types: 4 Glasses of wine, 8 Cans of beer per week     Comment: 2 glasses wine w/dinner ; 2-3 beers a night   • Drug use: Never     Family History   Problem Relation Name Age of Onset   • Thyroid disease Mother     • Diverticulosis Mother     • Coronary artery disease Father       No Known Allergies  Current Outpatient Medications   Medication Instructions   • buPROPion SR (WELLBUTRIN SR) 200 mg, oral, Daily   • lisdexamfetamine (Vyvanse) 20 mg capsule May take second dose before 2pm if needed   • lisdexamfetamine (VYVANSE) 20 mg, oral, Every morning   • sildenafil (REVATIO) 20 mg, oral, As needed   • tadalafil (CIALIS) 20 mg, oral, Daily PRN   • valACYclovir (VALTREX) 1,000 mg, oral, 3 times daily   • zolpidem (AMBIEN) 10 mg, oral, Nightly PRN       Objective       Physical Exam  Vital Signs: as recorded above  General: Well groomed, well nourished   Orientation:  Alert , oriented to time, place , and person   Mood and Affect:  Cooperative , no apparent distress normal affect  Skin: Good color, good turgor  Eyes: Extra ocular muscle movements intact, anicteric sclerae  Neck: Supple, full range of movement  Chest: Normal breath sounds, normal chest wall exam, symmetric, good air entry, clear to auscultation  Heart: Regular rate and rhythm, without murmur, gallop, or rubs  BACK:  no CTLS spine tenderness, no flank tenderness  Extremities: full range of movement  bilateral UE and bilateral LE,  no lower extremity edema  Neurological: Alert, oriented, cranial nerves II-XII grossly intact except for visual acuity  Sensation:  Intact   Gait: normal steady      Assessment/Plan   Travis Pierson is a 58 y.o. male who presents for the concerns below:    Problem List Items Addressed This Visit     None    HYPERCHOLESTEROLEMIA PLAN:  elevated not on medications  Follow low cholesterol diet, encouraged high omega 3 fatty acid intake in diet, exercise, weight control. . Will Obtain AST/ALT, fasting lipid profile if not done within past 3-6 months . Coenzyme Q10 200 mg a day if able to help increase HDL.  Start niacin see pt instructions he would like otc before statin    ATTENTION DEFICIT DISORDER PLAN: Effective at current dose . No side effects, , sleep, appetite, and weight stable. Medication will be continued used only for the prescribed medical condition If problems arise patient knows to discontinue meds and come back sooner.   OARRS report reviewed today  OARRS website checked and validated.  All prescriptions have been appropriately filled.  No suspicious activity was identified.   CONTROLLED SUBSTANCE AGREEMENT SIGNED today and uds due next month            Discussed with:   Return in : 2  months     Portions of this note were generated using digital voice recognition software, and may contain grammatical errors       Anayeli Nixon MD  07/25/24  11:59 AM   needed assist

## 2024-07-29 ENCOUNTER — TELEPHONE (OUTPATIENT)
Dept: PRIMARY CARE | Facility: CLINIC | Age: 59
End: 2024-07-29

## 2024-07-29 DIAGNOSIS — E78.00 HYPERCHOLESTEROLEMIA: Primary | ICD-10-CM

## 2024-07-29 NOTE — TELEPHONE ENCOUNTER
Pt is suppose to start taking OTC niacin.  He was doing some research.  There is niacin that is flushing and some that are non flushing.  Pt asks what's the difference and what should he take?      Also Pt asks if there is a  facility or treatment that you know of that will do a liver detox?      Pt requested the denial letter sent to us about the trial run of Vyvanse.  Scanned letter into his chart.  Sent Pt FAD ? IOt message.

## 2024-08-21 ENCOUNTER — TELEPHONE (OUTPATIENT)
Dept: PRIMARY CARE | Facility: CLINIC | Age: 59
End: 2024-08-21

## 2024-08-21 DIAGNOSIS — F98.8 ATTENTION DEFICIT DISORDER, UNSPECIFIED HYPERACTIVITY PRESENCE: ICD-10-CM

## 2024-08-21 DIAGNOSIS — F98.8 ATTENTION DEFICIT DISORDER OF ADULT: ICD-10-CM

## 2024-08-21 RX ORDER — LISDEXAMFETAMINE DIMESYLATE 20 MG/1
20 CAPSULE ORAL EVERY MORNING
Qty: 60 CAPSULE | Refills: 0 | Status: SHIPPED | OUTPATIENT
Start: 2024-08-21

## 2024-08-21 RX ORDER — LISDEXAMFETAMINE DIMESYLATE 20 MG/1
CAPSULE ORAL
Qty: 60 CAPSULE | Refills: 0 | Status: SHIPPED | OUTPATIENT
Start: 2024-08-21 | End: 2024-08-21 | Stop reason: SDUPTHER

## 2024-08-21 NOTE — TELEPHONE ENCOUNTER
Jhon pharmacist, called from [facility], he stated he needs clarification of the instructions for taking Vyvance medication. Jhon can be reached at .

## 2024-08-21 NOTE — TELEPHONE ENCOUNTER
Patient stated he needs a prescription for the following medication sent to a new pharmacy because he needs the generic version of it.     lisdexamfetamine (Vyvanse) 20 mg capsule     Please send prescription to [facility] on [address], phone# [phone]

## 2024-08-22 RX ORDER — LISDEXAMFETAMINE DIMESYLATE 40 MG/1
40 CAPSULE ORAL EVERY MORNING
Qty: 30 CAPSULE | Refills: 0 | Status: CANCELLED | OUTPATIENT
Start: 2024-08-22

## 2024-08-22 RX ORDER — LISDEXAMFETAMINE DIMESYLATE 40 MG/1
40 CAPSULE ORAL EVERY MORNING
COMMUNITY
End: 2024-08-26 | Stop reason: SDUPTHER

## 2024-08-22 NOTE — TELEPHONE ENCOUNTER
Brendan Arevalo-  I received a call today from [facility] in [place] to verify my Vyvance 40mg 1x Daily (Generic Only) prescription. However, I had already been in touch with them to verify they do not have Generic in stock.   I called your office and left direction to call the [facility] in [place] as they are one of the few locations to have the Generic Vyvanse 40mg in stock.   Are you able to send the refill to that [facility]?  I have attached a screen shot with the pharmacy info. Please confirm of/when the prescription is initiated with the [facility] below.  Thank you,  Travis

## 2024-08-26 DIAGNOSIS — F98.8 ATTENTION DEFICIT DISORDER, UNSPECIFIED HYPERACTIVITY PRESENCE: Primary | ICD-10-CM

## 2024-08-26 RX ORDER — LISDEXAMFETAMINE DIMESYLATE 40 MG/1
40 CAPSULE ORAL EVERY MORNING
Qty: 90 CAPSULE | Refills: 0 | Status: SHIPPED | OUTPATIENT
Start: 2024-08-26

## 2024-08-26 NOTE — TELEPHONE ENCOUNTER
Pls take  care of this, yes okay for 40 mg one a day and pls put to [facility] I can okay it once it is processed ty a

## 2024-10-30 ENCOUNTER — TELEPHONE (OUTPATIENT)
Dept: PRIMARY CARE | Facility: CLINIC | Age: 59
End: 2024-10-30

## 2024-11-21 DIAGNOSIS — F43.29 STRESS AND ADJUSTMENT REACTION: ICD-10-CM

## 2024-11-21 DIAGNOSIS — B00.9 HSV (HERPES SIMPLEX VIRUS) INFECTION: Primary | ICD-10-CM

## 2024-11-21 RX ORDER — LISDEXAMFETAMINE DIMESYLATE 40 MG/1
40 CAPSULE ORAL EVERY MORNING
Qty: 90 CAPSULE | Refills: 0 | OUTPATIENT
Start: 2024-11-21

## 2024-11-26 ENCOUNTER — LAB (OUTPATIENT)
Dept: LAB | Facility: LAB | Age: 59
End: 2024-11-26

## 2024-11-26 ENCOUNTER — OFFICE VISIT (OUTPATIENT)
Dept: PRIMARY CARE | Facility: CLINIC | Age: 59
End: 2024-11-26

## 2024-11-26 VITALS — DIASTOLIC BLOOD PRESSURE: 80 MMHG | WEIGHT: 207 LBS | SYSTOLIC BLOOD PRESSURE: 128 MMHG | BODY MASS INDEX: 28.87 KG/M2

## 2024-11-26 DIAGNOSIS — E78.00 HYPERCHOLESTEROLEMIA: Primary | ICD-10-CM

## 2024-11-26 DIAGNOSIS — Z79.899 MEDICATION MANAGEMENT: ICD-10-CM

## 2024-11-26 DIAGNOSIS — R93.1 ELEVATED CORONARY ARTERY CALCIUM SCORE: ICD-10-CM

## 2024-11-26 DIAGNOSIS — E78.00 HYPERCHOLESTEROLEMIA: ICD-10-CM

## 2024-11-26 DIAGNOSIS — F98.8 ATTENTION DEFICIT DISORDER OF ADULT: ICD-10-CM

## 2024-11-26 LAB
ALBUMIN SERPL BCP-MCNC: 4.6 G/DL (ref 3.4–5)
ALP SERPL-CCNC: 76 U/L (ref 33–120)
ALT SERPL W P-5'-P-CCNC: 53 U/L (ref 10–52)
AMPHETAMINES UR QL SCN: ABNORMAL
ANION GAP SERPL CALC-SCNC: 13 MMOL/L (ref 10–20)
AST SERPL W P-5'-P-CCNC: 39 U/L (ref 9–39)
BARBITURATES UR QL SCN: ABNORMAL
BILIRUB SERPL-MCNC: 1 MG/DL (ref 0–1.2)
BUN SERPL-MCNC: 16 MG/DL (ref 6–23)
BZE UR QL SCN: ABNORMAL
CALCIUM SERPL-MCNC: 9.3 MG/DL (ref 8.6–10.6)
CANNABINOIDS UR QL SCN: ABNORMAL
CHLORIDE SERPL-SCNC: 102 MMOL/L (ref 98–107)
CHOLEST SERPL-MCNC: 211 MG/DL (ref 0–199)
CHOLESTEROL/HDL RATIO: 4.8
CK SERPL-CCNC: 70 U/L (ref 0–325)
CO2 SERPL-SCNC: 29 MMOL/L (ref 21–32)
CREAT SERPL-MCNC: 0.99 MG/DL (ref 0.5–1.3)
CREAT UR-MCNC: 347.2 MG/DL (ref 20–370)
EGFRCR SERPLBLD CKD-EPI 2021: 89 ML/MIN/1.73M*2
GLUCOSE SERPL-MCNC: 84 MG/DL (ref 74–99)
HDLC SERPL-MCNC: 43.9 MG/DL
LDLC SERPL CALC-MCNC: 147 MG/DL
NON HDL CHOLESTEROL: 167 MG/DL (ref 0–149)
PCP UR QL SCN: ABNORMAL
POTASSIUM SERPL-SCNC: 4.2 MMOL/L (ref 3.5–5.3)
PROT SERPL-MCNC: 8 G/DL (ref 6.4–8.2)
SODIUM SERPL-SCNC: 140 MMOL/L (ref 136–145)
TRIGL SERPL-MCNC: 103 MG/DL (ref 0–149)
VLDL: 21 MG/DL (ref 0–40)

## 2024-11-26 PROCEDURE — 1036F TOBACCO NON-USER: CPT | Performed by: INTERNAL MEDICINE

## 2024-11-26 PROCEDURE — 36415 COLL VENOUS BLD VENIPUNCTURE: CPT

## 2024-11-26 PROCEDURE — 99214 OFFICE O/P EST MOD 30 MIN: CPT | Performed by: INTERNAL MEDICINE

## 2024-11-26 RX ORDER — BUPROPION HYDROCHLORIDE 200 MG/1
200 TABLET, EXTENDED RELEASE ORAL DAILY
Qty: 180 TABLET | Refills: 1 | Status: SHIPPED | OUTPATIENT
Start: 2024-11-26

## 2024-11-26 RX ORDER — VALACYCLOVIR HYDROCHLORIDE 1 G/1
1000 TABLET, FILM COATED ORAL 3 TIMES DAILY
Qty: 30 TABLET | Refills: 2 | Status: SHIPPED | OUTPATIENT
Start: 2024-11-26 | End: 2024-12-26

## 2024-11-26 RX ORDER — LISDEXAMFETAMINE DIMESYLATE 40 MG/1
40 CAPSULE ORAL EVERY MORNING
Qty: 90 CAPSULE | Refills: 0 | Status: SHIPPED | OUTPATIENT
Start: 2024-11-26

## 2024-11-26 ASSESSMENT — PATIENT HEALTH QUESTIONNAIRE - PHQ9
1. LITTLE INTEREST OR PLEASURE IN DOING THINGS: NOT AT ALL
SUM OF ALL RESPONSES TO PHQ9 QUESTIONS 1 AND 2: 0
2. FEELING DOWN, DEPRESSED OR HOPELESS: NOT AT ALL

## 2024-11-26 NOTE — PROGRESS NOTES
Subjective   Patient ID: Travis Pierson is a 58 y.o. male who presents for Med Refill.    HPI  Patient in for a visit  The generic vyvanse is not as effective , 90 days is good     Review of Systems  General: Denies fever, chills, night sweats,  Eyes: Negative for recent visual changes  Ears, Nose, Throat :  Negative for hearing changes, sinus discomfort  Dermatologic: Negative for new skin conditions, rash  Respiratory: Negative for wheezing, shortness of breath, cough  Cardiovascular: Negative for chest pain, palpitations, or leg swelling  Gastrointestinal: Negative for nausea/vomiting, abdominal pain, changes in bowel habits  Genitourinary Negative for Urinary Incontinence  urgency , frequency, discomfort   Musculoskeletal: see hpi  Neurological: Negative for headaches, dizziness    Previous history  Past Medical History:   Diagnosis Date    Contact with and (suspected) exposure to unspecified communicable disease 04/08/2021    Exposure to communicable disease    Depression, unspecified 11/19/2020    Depression    Disorder of penis, unspecified 11/12/2021    Penile lesion    Elevated blood-pressure reading, without diagnosis of hypertension 04/15/2020    Elevated blood pressure reading    Encounter for immunization 08/11/2021    Need for tetanus booster    Encounter for screening for malignant neoplasm of prostate 12/02/2022    Prostate cancer screening    Follicular cyst of the skin and subcutaneous tissue, unspecified 09/05/2019    Skin cyst    Insomnia, unspecified 06/03/2022    Insomnia    Other long term (current) drug therapy 06/03/2022    Medication management    Unspecified contact dermatitis, unspecified cause 07/08/2021    Contact dermatitis     Past Surgical History:   Procedure Laterality Date    HERNIA REPAIR  2008    Inguinal Hernia Repair    OTHER SURGICAL HISTORY  08/11/2021    Colonoscopy     Social History     Tobacco Use    Smoking status: Never    Smokeless tobacco: Never   Vaping Use     Vaping status: Never Used   Substance Use Topics    Alcohol use: Yes     Alcohol/week: 12.0 standard drinks of alcohol     Types: 4 Glasses of wine, 8 Cans of beer per week     Comment: 2 glasses wine w/dinner ; 2-3 beers a night    Drug use: Never     Family History   Problem Relation Name Age of Onset    Thyroid disease Mother      Diverticulosis Mother      Coronary artery disease Father       No Known Allergies  Current Outpatient Medications   Medication Instructions    buPROPion SR (WELLBUTRIN SR) 200 mg, oral, Daily    lisdexamfetamine (VYVANSE) 20 mg, oral, Every morning    lisdexamfetamine (VYVANSE) 20 mg, oral, Every morning, May take second dose before 2pm if needed    lisdexamfetamine (VYVANSE) 40 mg, oral, Every morning    sildenafil (REVATIO) 20 mg, As needed    tadalafil (CIALIS) 20 mg, oral, Daily PRN    valACYclovir (VALTREX) 1,000 mg, 3 times daily    zolpidem (AMBIEN) 10 mg, Nightly PRN       Objective       Physical Exam  Vital Signs: as recorded above  General: Well groomed, well nourished   Orientation:  Alert , oriented to time, place , and person   Mood and Affect:  Cooperative , no apparent distress normal affect  Skin: Good color, good turgor  Eyes: Extra ocular muscle movements intact, anicteric sclerae  Neck: Supple, full range of movement  Chest: Normal breath sounds, normal chest wall exam, symmetric, good air entry, clear to auscultation  Heart: Regular rate and rhythm, without murmur, gallop, or rubs  Extremities: full range of movement  bilateral UE and bilateral LE  Neurological: Alert, oriented, cranial nerves II-XII grossly intact except for visual acuity  Sensation:  Intact   Gait: normal steady      Assessment/Plan   Travis Pierson is a 58 y.o. male who presents for the concerns below:    Problem List Items Addressed This Visit    None  ATTENTION DEFICIT DISORDER PLAN: Effective at current dose . No side effects, , sleep, appetite, and weight stable. Medication will be continued  used only for the prescribed medical condition If problems arise patient knows to discontinue meds and come back sooner.   OARRS report reviewed today  OARRS website checked and validated.  All prescriptions have been appropriately filled.  No suspicious activity was identified.   CONTROLLED SUBSTANCE AGREEMENT SIGNED last visit July,  UDS today  INSOMNIA / SLEEP DISTURBANCE PLAN:   sleep hygiene, minimize caffeine products, establish routine and set time to sleep.  Has enough only prn intake Ambien   HYPERCHOLESTEROLEMIA PLAN:  elevated not on medications but taking Niacin , with only 200 mg he still flushes  Follow low cholesterol diet, encouraged high omega 3 fatty acid intake in diet, exercise, weight control. . Will Obtain AST/ALT, fasting lipid profile if not done within past 3-6 months . Coenzyme Q10 200 mg a day if able to help increase HDL.  Check lipid today has been on the niacin since August not always daily          Discussed with:   Return in : 4 months pending lipid panel    Portions of this note were generated using digital voice recognition software, and may contain grammatical errors       Anayeli Nixon MD  11/26/24  8:40 AM

## 2024-11-29 LAB
AMPHET UR-MCNC: 3409 NG/ML
MDA UR-MCNC: <200 NG/ML
MDEA UR-MCNC: <200 NG/ML
MDMA UR-MCNC: <200 NG/ML
METHAMPHET UR-MCNC: <200 NG/ML
PHENTERMINE UR CFM-MCNC: <200 NG/ML

## 2025-01-29 ENCOUNTER — PATIENT MESSAGE (OUTPATIENT)
Dept: PRIMARY CARE | Facility: CLINIC | Age: 60
End: 2025-01-29

## 2025-01-29 DIAGNOSIS — F98.8 ATTENTION DEFICIT DISORDER OF ADULT: ICD-10-CM

## 2025-01-30 RX ORDER — LISDEXAMFETAMINE DIMESYLATE 40 MG/1
40 CAPSULE ORAL EVERY MORNING
Qty: 90 CAPSULE | Refills: 0 | Status: SHIPPED | OUTPATIENT
Start: 2025-01-30

## 2025-02-27 ENCOUNTER — APPOINTMENT (OUTPATIENT)
Dept: PRIMARY CARE | Facility: CLINIC | Age: 60
End: 2025-02-27

## 2025-06-05 ENCOUNTER — OFFICE VISIT (OUTPATIENT)
Dept: PRIMARY CARE | Facility: CLINIC | Age: 60
End: 2025-06-05

## 2025-06-05 VITALS — SYSTOLIC BLOOD PRESSURE: 122 MMHG | WEIGHT: 207 LBS | DIASTOLIC BLOOD PRESSURE: 78 MMHG | BODY MASS INDEX: 28.87 KG/M2

## 2025-06-05 DIAGNOSIS — E78.00 HYPERCHOLESTEROLEMIA: ICD-10-CM

## 2025-06-05 DIAGNOSIS — Z13.6 SCREENING FOR CARDIOVASCULAR CONDITION: ICD-10-CM

## 2025-06-05 DIAGNOSIS — Z12.5 SCREENING FOR PROSTATE CANCER: Primary | ICD-10-CM

## 2025-06-05 DIAGNOSIS — F43.29 STRESS AND ADJUSTMENT REACTION: ICD-10-CM

## 2025-06-05 DIAGNOSIS — F98.8 ATTENTION DEFICIT DISORDER OF ADULT: ICD-10-CM

## 2025-06-05 PROCEDURE — 1036F TOBACCO NON-USER: CPT | Performed by: INTERNAL MEDICINE

## 2025-06-05 PROCEDURE — 99214 OFFICE O/P EST MOD 30 MIN: CPT | Performed by: INTERNAL MEDICINE

## 2025-06-05 RX ORDER — BUPROPION HYDROCHLORIDE 200 MG/1
200 TABLET, EXTENDED RELEASE ORAL DAILY
Qty: 180 TABLET | Refills: 0 | Status: SHIPPED | OUTPATIENT
Start: 2025-06-05

## 2025-06-05 RX ORDER — LISDEXAMFETAMINE DIMESYLATE 40 MG/1
40 CAPSULE ORAL EVERY MORNING
Qty: 90 CAPSULE | Refills: 0 | Status: SHIPPED | OUTPATIENT
Start: 2025-06-05

## 2025-06-05 RX ORDER — TADALAFIL 20 MG/1
20 TABLET ORAL DAILY PRN
Qty: 20 TABLET | Refills: 3 | Status: SHIPPED | OUTPATIENT
Start: 2025-06-05 | End: 2026-06-05

## 2025-06-05 NOTE — PATIENT INSTRUCTIONS
Will need your PSA blood test screen for prostate cancer   Also will need an updated lipid panel on the niacin , goal for the LDL is 100 or less.   This is to prevent further plaque formation in the heart vessel.  If still above then would need to start a statin.

## 2025-06-05 NOTE — PROGRESS NOTES
Subjective   Patient ID: Travis Pierson is a 59 y.o. male who presents for Med Refill (Needs refill on vyvanse and tadalafil).    HPI  Patient in for a visit  Doing well , blood pressure is good   Not exercising much , less stress  Takes vyvanse get sleepy if he does not   Also need tadalafil     Review of Systems  General: Denies fever, chills, night sweats,  Eyes: Negative for recent visual changes  Ears, Nose, Throat :  Negative for hearing changes, sinus discomfort  Dermatologic: Negative for new skin conditions, rash  Respiratory: Negative for wheezing, shortness of breath, cough  Cardiovascular: Negative for chest pain, palpitations, or leg swelling  Gastrointestinal: Negative for nausea/vomiting, abdominal pain, changes in bowel habits  Genitourinary Negative for Urinary Incontinence  urgency , frequency, discomfort   Musculoskeletal: see hpi  Neurological: Negative for headaches, dizziness    Previous history  Medical History[1]  Surgical History[2]  Social History[3]  Family History[4]  Allergies[5]  Current Outpatient Medications   Medication Instructions    buPROPion SR (WELLBUTRIN SR) 200 mg, oral, Daily    lisdexamfetamine (VYVANSE) 40 mg, oral, Every morning    tadalafil 20 mg, oral, Daily PRN    zolpidem (AMBIEN) 10 mg, Nightly PRN       Objective       Physical Exam  Vital Signs: as recorded above  General: Well groomed, well nourished   Orientation:  Alert , oriented to time, place , and person   Mood and Affect:  Cooperative , no apparent distress normal affect  Skin: Good color, good turgor  Eyes: Extra ocular muscle movements intact, anicteric sclerae  Neck: Supple, full range of movement  Chest: Normal breath sounds, normal chest wall exam, symmetric, good air entry, clear to auscultation  Heart: Regular rate and rhythm, without murmur, gallop, or rubs  BACK:  no CTLS spine tenderness, no flank tenderness  Extremities: full range of movement  bilateral UE and bilateral LE,  no lower extremity  edema  Neurological: Alert, oriented, cranial nerves II-XII grossly intact except for visual acuity  Sensation:  Intact   Gait: normal steady      Assessment/Plan   Travis Pierson is a 59 y.o. male who presents for the concerns below:    Problem List Items Addressed This Visit    None  ATTENTION DEFICIT DISORDER PLAN: Effective at current dose . No side effects, , sleep, appetite, and weight stable. Medication will be continued used only for the prescribed medical condition If problems arise patient knows to discontinue meds and come back sooner.   OARRS report reviewed today  OARRS website checked and validated.  All prescriptions have been appropriately filled.  No suspicious activity was identified.   CONTROLLED SUBSTANCE AGREEMENT SIGNED    ERECTILE DYSFUNCtion also due for yearly PSA order is in     HYPERCHOLESTEROLEMIA PLAN:  elevated LDL still the last check on niacin 500 mg  Follow low cholesterol diet talked about Dr Luevano diet  encouraged high omega 3 fatty acid intake in diet, exercise, weight control. . Will Obtain AST/ALT, fasting lipid profile if not done within past 3-6 months . Coenzyme Q10 200 mg a day if able to help increase HDL.       Blood pressure is good . Psa today or soonest available , then lipid panel before our next visit      Discussed with:   Return in : 6 months or November     Portions of this note were generated using digital voice recognition software, and may contain grammatical errors       Anayeli Nixon MD  06/05/25  11:36 AM       [1]   Past Medical History:  Diagnosis Date    Contact with and (suspected) exposure to unspecified communicable disease 04/08/2021    Exposure to communicable disease    Depression, unspecified 11/19/2020    Depression    Disorder of penis, unspecified 11/12/2021    Penile lesion    Elevated blood-pressure reading, without diagnosis of hypertension 04/15/2020    Elevated blood pressure reading    Encounter for immunization  08/11/2021    Need for tetanus booster    Encounter for screening for malignant neoplasm of prostate 12/02/2022    Prostate cancer screening    Follicular cyst of the skin and subcutaneous tissue, unspecified 09/05/2019    Skin cyst    Insomnia, unspecified 06/03/2022    Insomnia    Other long term (current) drug therapy 06/03/2022    Medication management    Unspecified contact dermatitis, unspecified cause 07/08/2021    Contact dermatitis   [2]   Past Surgical History:  Procedure Laterality Date    HERNIA REPAIR  2008    Inguinal Hernia Repair    OTHER SURGICAL HISTORY  08/11/2021    Colonoscopy   [3]   Social History  Tobacco Use    Smoking status: Never     Passive exposure: Never    Smokeless tobacco: Never   Vaping Use    Vaping status: Never Used   Substance Use Topics    Alcohol use: Yes     Alcohol/week: 12.0 standard drinks of alcohol     Types: 4 Glasses of wine, 8 Cans of beer per week     Comment: 2 glasses wine w/dinner ; 2-3 beers a night    Drug use: Never   [4]   Family History  Problem Relation Name Age of Onset    Thyroid disease Mother      Diverticulosis Mother      Coronary artery disease Father     [5] No Known Allergies

## 2025-06-06 DIAGNOSIS — E78.00 HYPERCHOLESTEROLEMIA: ICD-10-CM

## 2025-06-06 DIAGNOSIS — F98.8 ATTENTION DEFICIT DISORDER OF ADULT: ICD-10-CM

## 2025-06-06 DIAGNOSIS — Z13.6 SCREENING FOR CARDIOVASCULAR CONDITION: ICD-10-CM

## 2025-06-12 RX ORDER — LISDEXAMFETAMINE DIMESYLATE 40 MG/1
40 CAPSULE ORAL EVERY MORNING
Qty: 90 CAPSULE | Refills: 0 | Status: SHIPPED | OUTPATIENT
Start: 2025-06-12

## 2025-09-02 ENCOUNTER — APPOINTMENT (OUTPATIENT)
Dept: PRIMARY CARE | Facility: CLINIC | Age: 60
End: 2025-09-02

## 2025-12-02 ENCOUNTER — APPOINTMENT (OUTPATIENT)
Dept: PRIMARY CARE | Facility: CLINIC | Age: 60
End: 2025-12-02